# Patient Record
Sex: MALE | URBAN - METROPOLITAN AREA
[De-identification: names, ages, dates, MRNs, and addresses within clinical notes are randomized per-mention and may not be internally consistent; named-entity substitution may affect disease eponyms.]

---

## 2023-11-28 ENCOUNTER — OFFICE VISIT (OUTPATIENT)
Dept: URBAN - METROPOLITAN AREA CLINIC 63 | Facility: CLINIC | Age: 83
End: 2023-11-28
Payer: MEDICARE

## 2023-11-28 VITALS
DIASTOLIC BLOOD PRESSURE: 70 MMHG | BODY MASS INDEX: 24.34 KG/M2 | WEIGHT: 170 LBS | TEMPERATURE: 96.8 F | SYSTOLIC BLOOD PRESSURE: 130 MMHG | HEART RATE: 81 BPM | HEIGHT: 70 IN

## 2023-11-28 DIAGNOSIS — Z87.19 HISTORY OF BARRETT'S ESOPHAGUS: ICD-10-CM

## 2023-11-28 DIAGNOSIS — R19.8 ALTERNATING CONSTIPATION AND DIARRHEA: ICD-10-CM

## 2023-11-28 PROBLEM — 14760008: Status: ACTIVE | Noted: 2023-11-28

## 2023-11-28 PROCEDURE — 99204 OFFICE O/P NEW MOD 45 MIN: CPT

## 2023-11-28 RX ORDER — ASCORBIC ACID 250 MG
1 TABLET TABLET,CHEWABLE ORAL ONCE A DAY
Status: ACTIVE | COMMUNITY

## 2023-11-28 RX ORDER — MULTIVITAMIN
1 TABLET TABLET ORAL ONCE A DAY
Status: ACTIVE | COMMUNITY

## 2023-11-28 RX ORDER — ISOSORBIDE MONONITRATE 120 MG/1
1 TABLET IN THE MORNING TABLET, EXTENDED RELEASE ORAL ONCE A DAY
Status: ACTIVE | COMMUNITY

## 2023-11-28 RX ORDER — ALUMINUM ZIRCONIUM TRICHLOROHYDREX GLY 0.19 G/G
1 TABLET 30 MINUTES BEFORE MORNING MEAL STICK TOPICAL ONCE A DAY
Status: ACTIVE | COMMUNITY

## 2023-11-28 RX ORDER — CILOSTAZOL 100 MG/1
1 TABLET 30 MINUTES BEFORE OR 2 HOURS AFTER BREAKFAST AND DINNER TABLET ORAL TWICE A DAY
Status: ACTIVE | COMMUNITY

## 2023-11-28 RX ORDER — VALSARTAN 320 MG/1
1 TABLET TABLET, FILM COATED ORAL ONCE A DAY
Status: ACTIVE | COMMUNITY

## 2023-11-28 RX ORDER — ASCORBIC ACID 1000 MG
1 CAPSULE TABLET ORAL ONCE A DAY
Status: ACTIVE | COMMUNITY

## 2023-11-28 RX ORDER — ROSUVASTATIN CALCIUM 20 MG/1
1 TABLET TABLET, COATED ORAL ONCE A DAY
Status: ACTIVE | COMMUNITY

## 2023-11-28 RX ORDER — BENZONATATE 100 MG/1
1 CAPSULE AS NEEDED CAPSULE ORAL THREE TIMES A DAY
Status: ACTIVE | COMMUNITY

## 2023-11-28 RX ORDER — METOPROLOL SUCCINATE 25 MG/1
1 TABLET TABLET, FILM COATED, EXTENDED RELEASE ORAL ONCE A DAY
Status: ACTIVE | COMMUNITY

## 2023-11-28 RX ORDER — PRASUGREL 10 MG/1
AS DIRECTED TABLET, FILM COATED ORAL
Status: ACTIVE | COMMUNITY

## 2023-11-28 NOTE — HPI-TODAY'S VISIT:
Aiden is an 83-year-old male presenting to the office today for evaluation of alternating constipation/diarrhea. This has been an ongoing problem for about 3 years. He states that he will have 4-5 bowel movements per week. Normally he has a [place] stool 3 but when he has diarrhea it will be a [place] 6 or 7. He has not noticed anything that makes symptoms better or worse. He does eat dairy. His last colonoscopy was about 15 years ago. However, it he has been doing Cologuard. He did have a negative Cologuard 3 years ago per patient. He also had a negative fit test on 11/2/2023. He does admit to 10 pound weight loss this year, however, he has been trying to lose weight. He also has a history of Aguila's esophagus per patient. His last EGD was 20 years ago. He takes famotidine in the evening and has not had any reflux/breakthrough symptoms. He has no other complaints, questions, concerns. He denies fever, nausea/vomiting, GERD, hematemesis, anorexia, melena, hematochezia, blood when wiping, change in stool caliber, unintentional weight loss, abdominal pain.

## 2023-11-28 NOTE — HPI-PREVIOUS LABS
Labs dated 11/2/2023; - Negative fecal occult blood immunoassay - White blood cells stool-none seen - CBC; RBC 3.77, hemoglobin 12.2, hematocrit 36.9, MCH 32.4, MCV 97.9, platelets 127, neutrophil percent 68.8, lymph #0.8 . Labs dated 10/25/2023 displaying the following abnormalities; - CMP; GFR 64.56

## 2023-12-01 ENCOUNTER — WEB ENCOUNTER (OUTPATIENT)
Dept: URBAN - METROPOLITAN AREA CLINIC 63 | Facility: CLINIC | Age: 83
End: 2023-12-01

## 2024-03-01 ENCOUNTER — OV EP (OUTPATIENT)
Dept: URBAN - METROPOLITAN AREA CLINIC 60 | Facility: CLINIC | Age: 84
End: 2024-03-01
Payer: MEDICARE

## 2024-03-01 VITALS
WEIGHT: 168 LBS | TEMPERATURE: 98.7 F | DIASTOLIC BLOOD PRESSURE: 66 MMHG | RESPIRATION RATE: 12 BRPM | SYSTOLIC BLOOD PRESSURE: 114 MMHG | HEART RATE: 68 BPM | BODY MASS INDEX: 24.05 KG/M2 | HEIGHT: 70 IN | OXYGEN SATURATION: 98 %

## 2024-03-01 DIAGNOSIS — Z86.010 PERSONAL HISTORY OF COLONIC POLYPS: ICD-10-CM

## 2024-03-01 DIAGNOSIS — R19.8 ALTERNATING CONSTIPATION AND DIARRHEA: ICD-10-CM

## 2024-03-01 DIAGNOSIS — Z87.19 HISTORY OF BARRETT'S ESOPHAGUS: ICD-10-CM

## 2024-03-01 PROBLEM — 428283002: Status: ACTIVE | Noted: 2024-03-01

## 2024-03-01 PROCEDURE — 99214 OFFICE O/P EST MOD 30 MIN: CPT | Performed by: PHYSICIAN ASSISTANT

## 2024-03-01 RX ORDER — VALSARTAN 320 MG/1
1 TABLET TABLET, FILM COATED ORAL ONCE A DAY
Status: ACTIVE | COMMUNITY

## 2024-03-01 RX ORDER — METOPROLOL SUCCINATE 25 MG/1
1 TABLET TABLET, FILM COATED, EXTENDED RELEASE ORAL ONCE A DAY
Status: ACTIVE | COMMUNITY

## 2024-03-01 RX ORDER — MULTIVITAMIN
1 TABLET TABLET ORAL ONCE A DAY
Status: ACTIVE | COMMUNITY

## 2024-03-01 RX ORDER — ALUMINUM ZIRCONIUM TRICHLOROHYDREX GLY 0.19 G/G
1 TABLET 30 MINUTES BEFORE MORNING MEAL STICK TOPICAL ONCE A DAY
Status: ACTIVE | COMMUNITY

## 2024-03-01 RX ORDER — ISOSORBIDE MONONITRATE 120 MG/1
1 TABLET IN THE MORNING TABLET, EXTENDED RELEASE ORAL ONCE A DAY
Status: ACTIVE | COMMUNITY

## 2024-03-01 RX ORDER — BENZONATATE 100 MG/1
1 CAPSULE AS NEEDED CAPSULE ORAL THREE TIMES A DAY
Status: ACTIVE | COMMUNITY

## 2024-03-01 RX ORDER — ASCORBIC ACID 1000 MG
1 CAPSULE TABLET ORAL ONCE A DAY
Status: ACTIVE | COMMUNITY

## 2024-03-01 RX ORDER — PRASUGREL 10 MG/1
AS DIRECTED TABLET, FILM COATED ORAL
Status: ACTIVE | COMMUNITY

## 2024-03-01 RX ORDER — CHOLESTYRAMINE 4 G/9G
1 PACKET MIXED WITH WATER OR NON-CARBONATED DRINK POWDER, FOR SUSPENSION ORAL ONCE A DAY
Qty: 30 | Refills: 2 | OUTPATIENT
Start: 2024-03-01

## 2024-03-01 RX ORDER — CILOSTAZOL 100 MG/1
1 TABLET 30 MINUTES BEFORE OR 2 HOURS AFTER BREAKFAST AND DINNER TABLET ORAL TWICE A DAY
Status: ACTIVE | COMMUNITY

## 2024-03-01 RX ORDER — ROSUVASTATIN CALCIUM 20 MG/1
1 TABLET TABLET, COATED ORAL ONCE A DAY
Status: ACTIVE | COMMUNITY

## 2024-03-01 RX ORDER — ASCORBIC ACID 250 MG
1 TABLET TABLET,CHEWABLE ORAL ONCE A DAY
Status: ACTIVE | COMMUNITY

## 2024-03-01 NOTE — HPI-TODAY'S VISIT:
84-year-old male with CAD on effient, PAD on pletal, hyperlipidemia, hypertension, arthritis, colon polyps, GERD, Aguila's esophagus presents for follow-up.  He is a patient of Dr. Connolly.  He presented to the office in November 2023 to schedule surveillance colonoscopy and also with complaints of alternating constipation with diarrhea which had been going on for about 3 years.  He reported that he would normally have 4-5 bowel movements per week, usually type III on the cc.  He would also have periods of watery diarrhea.  He denied any GERD symptoms on famotidine.  He was advised to eat a high-fiber diet and add fiber therapy daily.  Trial of lactose-free diet was recommended.  Surveillance EGD and colonoscopy were not recommended in view of his advanced age and comorbid conditions.  He follows up in the office today reporting no change in his abnormal bowel habits. He moves his bowels every day. He states he usually starts off with loose stools/diarrhea for a couple of days and then he will take Imodium and his stools will firm up to a type III on BSS. He has anywhere from 1-3 bowel movements per day. He denies any visible blood in the stool. He never feels like he completely evacuates his bowels. He denies any abdominal pain. He states that he is always trying to lose weight and he lost about 10 pounds 6 months ago intentionally. He tried taking psyllium but he read that it was a laxative and it was not working so he stopped using this. He takes a daily probiotic. He tried lactose-free diet for a short time with no improvement. He has had issues with loose stools for about 3 years.  He had a negative fecal occult blood test in November 2023 through his PCP. Stool was also negative for white blood cells at that time.  Last EGD was done about 20 years ago.  Last colonoscopy was done 15 years ago.  He reported having a negative stool Cologuard 3 years ago and a negative fit test in November 2023.

## 2024-03-14 LAB
CAMPYLOBACTER SPP. AG,EIA: (no result)
SALMONELLA AND SHIGELLA, CULTURE: (no result)
SHIGA TOXINS, EIA W/RFL TO E.COLI O157 CULTURE: (no result)

## 2024-03-15 LAB
CALPROTECTIN, FECAL: 82
FECAL FAT, QUALITATIVE: (no result)
PANCREATIC ELASTASE, FECAL: 339

## 2024-04-11 ENCOUNTER — OV EP (OUTPATIENT)
Dept: URBAN - METROPOLITAN AREA CLINIC 60 | Facility: CLINIC | Age: 84
End: 2024-04-11
Payer: MEDICARE

## 2024-04-11 VITALS
WEIGHT: 169 LBS | OXYGEN SATURATION: 98 % | SYSTOLIC BLOOD PRESSURE: 138 MMHG | DIASTOLIC BLOOD PRESSURE: 62 MMHG | BODY MASS INDEX: 24.2 KG/M2 | HEIGHT: 70 IN | HEART RATE: 73 BPM | TEMPERATURE: 98.4 F

## 2024-04-11 DIAGNOSIS — R19.8 ALTERNATING CONSTIPATION AND DIARRHEA: ICD-10-CM

## 2024-04-11 DIAGNOSIS — Z87.19 HISTORY OF BARRETT'S ESOPHAGUS: ICD-10-CM

## 2024-04-11 PROCEDURE — 99213 OFFICE O/P EST LOW 20 MIN: CPT

## 2024-04-11 RX ORDER — ROSUVASTATIN CALCIUM 20 MG/1
1 TABLET TABLET, COATED ORAL ONCE A DAY
Status: ACTIVE | COMMUNITY

## 2024-04-11 RX ORDER — CHOLESTYRAMINE 4 G/9G
1 PACKET MIXED WITH WATER OR NON-CARBONATED DRINK POWDER, FOR SUSPENSION ORAL ONCE A DAY
Qty: 30 | Refills: 2 | COMMUNITY
Start: 2024-03-01

## 2024-04-11 RX ORDER — ALUMINUM ZIRCONIUM TRICHLOROHYDREX GLY 0.19 G/G
1 TABLET 30 MINUTES BEFORE MORNING MEAL STICK TOPICAL ONCE A DAY
Status: ON HOLD | COMMUNITY

## 2024-04-11 RX ORDER — ISOSORBIDE MONONITRATE 120 MG/1
1 TABLET IN THE MORNING TABLET, EXTENDED RELEASE ORAL ONCE A DAY
Status: ACTIVE | COMMUNITY

## 2024-04-11 RX ORDER — VALSARTAN 320 MG/1
1 TABLET TABLET, FILM COATED ORAL ONCE A DAY
Status: ACTIVE | COMMUNITY

## 2024-04-11 RX ORDER — MULTIVITAMIN
1 TABLET TABLET ORAL ONCE A DAY
Status: ON HOLD | COMMUNITY

## 2024-04-11 RX ORDER — METOPROLOL SUCCINATE 25 MG/1
1 TABLET TABLET, FILM COATED, EXTENDED RELEASE ORAL ONCE A DAY
Status: ACTIVE | COMMUNITY

## 2024-04-11 RX ORDER — ASCORBIC ACID 250 MG
1 TABLET TABLET,CHEWABLE ORAL ONCE A DAY
Status: ON HOLD | COMMUNITY

## 2024-04-11 RX ORDER — PRASUGREL 10 MG/1
AS DIRECTED TABLET, FILM COATED ORAL
Status: ACTIVE | COMMUNITY

## 2024-04-11 RX ORDER — ASCORBIC ACID 1000 MG
1 CAPSULE TABLET ORAL ONCE A DAY
Status: ON HOLD | COMMUNITY

## 2024-04-11 RX ORDER — CILOSTAZOL 100 MG/1
1 TABLET 30 MINUTES BEFORE OR 2 HOURS AFTER BREAKFAST AND DINNER TABLET ORAL TWICE A DAY
Status: ACTIVE | COMMUNITY

## 2024-04-11 RX ORDER — BENZONATATE 100 MG/1
1 CAPSULE AS NEEDED CAPSULE ORAL THREE TIMES A DAY
Status: ACTIVE | COMMUNITY

## 2024-04-11 NOTE — HPI-TODAY'S VISIT:
Gilbert is an 84-year-old male presenting to the office today for follow-up on alternating constipation and diarrhea. At his last visit he was advised to start a low FODMAP diet and was started on cholestyramine. He states that symptoms have been much better since his last visit. Normally having 1-2 bowel movements per day and doing well overall. He has had diarrhea twice since his last visit including diarrhea earlier today. Otherwise, he has no GI complaints, questions, concerns at this time. He denies melena, hematochezia, bright red blood per rectum, nausea/vomiting, hematemesis, dysphagia, reflux, abdominal pain, change in stool caliber, unintentional weight loss/weight gain. . 3/15/2024 labs; - Fecal calprotectin, pancreatic elastase, fecal fat, stool culture within normal limits . Labs dated 11/2/2023; - Negative fecal occult blood immunoassay - White blood cells stool-none seen - CBC; RBC 3.77, hemoglobin 12.2, hematocrit 36.9, MCH 32.4, MCV 97.9, platelets 127, neutrophil percent 68.8, lymph #0.8 . Labs dated 10/25/2023 displaying the following abnormalities; - CMP; GFR 64.56

## 2024-06-18 ENCOUNTER — DASHBOARD ENCOUNTERS (OUTPATIENT)
Age: 84
End: 2024-06-18

## 2024-06-18 ENCOUNTER — OFFICE VISIT (OUTPATIENT)
Dept: URBAN - METROPOLITAN AREA CLINIC 60 | Facility: CLINIC | Age: 84
End: 2024-06-18
Payer: MEDICARE

## 2024-06-18 VITALS
TEMPERATURE: 97.1 F | HEIGHT: 70 IN | BODY MASS INDEX: 24.05 KG/M2 | DIASTOLIC BLOOD PRESSURE: 64 MMHG | SYSTOLIC BLOOD PRESSURE: 130 MMHG | WEIGHT: 168 LBS

## 2024-06-18 DIAGNOSIS — R19.4 CHANGE IN BOWEL HABITS: ICD-10-CM

## 2024-06-18 DIAGNOSIS — Z12.11 COLON CANCER SCREENING: ICD-10-CM

## 2024-06-18 DIAGNOSIS — K22.70 BARRETT'S ESOPHAGUS WITHOUT DYSPLASIA: ICD-10-CM

## 2024-06-18 PROBLEM — 302914006: Status: ACTIVE | Noted: 2024-06-18

## 2024-06-18 PROCEDURE — 99214 OFFICE O/P EST MOD 30 MIN: CPT

## 2024-06-18 RX ORDER — ROSUVASTATIN CALCIUM 20 MG/1
1 TABLET TABLET, COATED ORAL ONCE A DAY
Status: ACTIVE | COMMUNITY

## 2024-06-18 RX ORDER — ASCORBIC ACID 1000 MG
1 CAPSULE TABLET ORAL ONCE A DAY
Status: ON HOLD | COMMUNITY

## 2024-06-18 RX ORDER — ISOSORBIDE MONONITRATE 120 MG/1
1 TABLET IN THE MORNING TABLET, EXTENDED RELEASE ORAL ONCE A DAY
Status: ACTIVE | COMMUNITY

## 2024-06-18 RX ORDER — CILOSTAZOL 100 MG/1
1 TABLET 30 MINUTES BEFORE OR 2 HOURS AFTER BREAKFAST AND DINNER TABLET ORAL TWICE A DAY
Status: ACTIVE | COMMUNITY

## 2024-06-18 RX ORDER — BENZONATATE 100 MG/1
1 CAPSULE AS NEEDED CAPSULE ORAL THREE TIMES A DAY
Status: ACTIVE | COMMUNITY

## 2024-06-18 RX ORDER — ASCORBIC ACID 250 MG
1 TABLET TABLET,CHEWABLE ORAL ONCE A DAY
Status: ON HOLD | COMMUNITY

## 2024-06-18 RX ORDER — MULTIVITAMIN
1 TABLET TABLET ORAL ONCE A DAY
Status: ON HOLD | COMMUNITY

## 2024-06-18 RX ORDER — ONDANSETRON HYDROCHLORIDE 4 MG/1
1 TABLET TABLET, FILM COATED ORAL
Qty: 2 | Refills: 0 | OUTPATIENT
Start: 2024-06-18

## 2024-06-18 RX ORDER — PRASUGREL 10 MG/1
AS DIRECTED TABLET, FILM COATED ORAL
Status: ACTIVE | COMMUNITY

## 2024-06-18 RX ORDER — VALSARTAN 320 MG/1
1 TABLET TABLET, FILM COATED ORAL ONCE A DAY
Status: ACTIVE | COMMUNITY

## 2024-06-18 RX ORDER — ALUMINUM ZIRCONIUM TRICHLOROHYDREX GLY 0.19 G/G
1 TABLET 30 MINUTES BEFORE MORNING MEAL STICK TOPICAL ONCE A DAY
Status: ON HOLD | COMMUNITY

## 2024-06-18 RX ORDER — CHOLESTYRAMINE 4 G/9G
1 PACKET MIXED WITH WATER OR NON-CARBONATED DRINK POWDER, FOR SUSPENSION ORAL ONCE A DAY
Qty: 30 | Refills: 2 | COMMUNITY
Start: 2024-03-01

## 2024-06-18 RX ORDER — METOPROLOL SUCCINATE 25 MG/1
1 TABLET TABLET, FILM COATED, EXTENDED RELEASE ORAL ONCE A DAY
Status: ACTIVE | COMMUNITY

## 2024-06-18 NOTE — HPI-ZZZTODAY'S VISIT
Gilbert is a very pleasant 84-year-old male who presents for 2-month follow-up.  He is a patient of Dr. Connolly.  Last seen by Omar Swift PA-C on 4/11/2024.  Past medical history significant for hypertension, GERD, arthritis, colon polyps, CAD,  HLD, Aguila's esophagus.  Past surgical history significant for cardiac catheterization, back surgery, tonsillectomy.  Last colonoscopy November 2010.  Family history noncontributory. Gilbert has been evaluated for alternating constipation and diarrhea.  He was advised previously to start a low FODMAP diet and cholestyramine.  This is improved symptoms drastically.  It was discussed with patient the trial of Xifaxan could be considered if repeat incidence.  Patient has history of Aguila's esophagus but due to age and comorbidity was decided that we would not proceed with EGD  Gilbert presents for follow up. He has continued to have fluctuating bowel habits. This is interfering with dialy activity. He has tried cholestyramine with limited efficacy. FODMAP seems to help. He still feels the he does not know what foods cause his flutuating bowels. He denies dysphagia, dyspepsia, pyrosis, abdominal pain, melena, or hematochezia.

## 2024-06-20 ENCOUNTER — WEB ENCOUNTER (OUTPATIENT)
Dept: URBAN - METROPOLITAN AREA CLINIC 60 | Facility: CLINIC | Age: 84
End: 2024-06-20

## 2024-06-20 RX ORDER — ONDANSETRON HYDROCHLORIDE 4 MG/1
1 TABLET TABLET, FILM COATED ORAL
Qty: 2 | Refills: 0
Start: 2024-06-18

## 2024-06-25 ENCOUNTER — LAB OUTSIDE AN ENCOUNTER (OUTPATIENT)
Dept: URBAN - METROPOLITAN AREA CLINIC 60 | Facility: CLINIC | Age: 84
End: 2024-06-25

## 2024-07-08 ENCOUNTER — WEB ENCOUNTER (OUTPATIENT)
Dept: URBAN - METROPOLITAN AREA CLINIC 60 | Facility: CLINIC | Age: 84
End: 2024-07-08

## 2024-07-16 ENCOUNTER — TELEPHONE ENCOUNTER (OUTPATIENT)
Dept: URBAN - METROPOLITAN AREA CLINIC 60 | Facility: CLINIC | Age: 84
End: 2024-07-16

## 2024-07-24 ENCOUNTER — TELEPHONE ENCOUNTER (OUTPATIENT)
Dept: URBAN - METROPOLITAN AREA CLINIC 63 | Facility: CLINIC | Age: 84
End: 2024-07-24

## 2024-08-05 ENCOUNTER — CLAIMS CREATED FROM THE CLAIM WINDOW (OUTPATIENT)
Dept: URBAN - METROPOLITAN AREA SURGERY CENTER 4 | Facility: SURGERY CENTER | Age: 84
End: 2024-08-05
Payer: MEDICARE

## 2024-08-05 ENCOUNTER — CLAIMS CREATED FROM THE CLAIM WINDOW (OUTPATIENT)
Dept: URBAN - METROPOLITAN AREA CLINIC 4 | Facility: CLINIC | Age: 84
End: 2024-08-05
Payer: MEDICARE

## 2024-08-05 DIAGNOSIS — K44.9 DIAPHRAGMATIC HERNIA WITHOUT OBSTRUCTION OR GANGRENE: ICD-10-CM

## 2024-08-05 DIAGNOSIS — K57.30 DIVERTICULOSIS OF LARGE INTESTINE WITHOUT PERFORATION OR ABSCESS WITHOUT BLEEDING: ICD-10-CM

## 2024-08-05 DIAGNOSIS — K22.719 BARRETT'S ESOPHAGUS WITH DYSPLASIA, UNSPECIFIED: ICD-10-CM

## 2024-08-05 DIAGNOSIS — D12.2 ADENOMATOUS POLYP OF ASCENDING COLON: ICD-10-CM

## 2024-08-05 DIAGNOSIS — K29.70 GASTRITIS: ICD-10-CM

## 2024-08-05 DIAGNOSIS — K64.0 FIRST DEGREE HEMORRHOIDS: ICD-10-CM

## 2024-08-05 DIAGNOSIS — K63.5 POLYP OF ASCENDING COLON, UNSPECIFIED TYPE: ICD-10-CM

## 2024-08-05 DIAGNOSIS — Z12.11 ENCOUNTER FOR SCREENING FOR MALIGNANT NEOPLASM OF COLON: ICD-10-CM

## 2024-08-05 DIAGNOSIS — Z12.11 COLON CANCER SCREENING: ICD-10-CM

## 2024-08-05 DIAGNOSIS — K29.60 OTHER GASTRITIS WITHOUT BLEEDING: ICD-10-CM

## 2024-08-05 DIAGNOSIS — D12.3 BENIGN NEOPLASM OF TRANSVERSE COLON: ICD-10-CM

## 2024-08-05 DIAGNOSIS — D12.3 BENIGN NEOPLASM OF HEPATIC FLEXURE OF COLON: ICD-10-CM

## 2024-08-05 DIAGNOSIS — K29.70 GASTRITIS, UNSPECIFIED, WITHOUT BLEEDING: ICD-10-CM

## 2024-08-05 DIAGNOSIS — K44.9 HIATAL HERNIA: ICD-10-CM

## 2024-08-05 DIAGNOSIS — K22.89 OTHER SPECIFIED DISEASE OF ESOPHAGUS: ICD-10-CM

## 2024-08-05 DIAGNOSIS — D12.2 BENIGN NEOPLASM OF ASCENDING COLON: ICD-10-CM

## 2024-08-05 PROCEDURE — 88342 IMHCHEM/IMCYTCHM 1ST ANTB: CPT | Performed by: PATHOLOGY

## 2024-08-05 PROCEDURE — 88305 TISSUE EXAM BY PATHOLOGIST: CPT | Performed by: PATHOLOGY

## 2024-08-05 PROCEDURE — 45385 COLONOSCOPY W/LESION REMOVAL: CPT | Performed by: CLINIC/CENTER

## 2024-08-05 PROCEDURE — 43239 EGD BIOPSY SINGLE/MULTIPLE: CPT | Performed by: INTERNAL MEDICINE

## 2024-08-05 PROCEDURE — 43239 EGD BIOPSY SINGLE/MULTIPLE: CPT | Performed by: CLINIC/CENTER

## 2024-08-05 PROCEDURE — 00813 ANES UPR LWR GI NDSC PX: CPT | Performed by: NURSE ANESTHETIST, CERTIFIED REGISTERED

## 2024-08-05 PROCEDURE — 45385 COLONOSCOPY W/LESION REMOVAL: CPT | Performed by: INTERNAL MEDICINE

## 2024-08-05 RX ORDER — ALUMINUM ZIRCONIUM TRICHLOROHYDREX GLY 0.19 G/G
1 TABLET 30 MINUTES BEFORE MORNING MEAL STICK TOPICAL ONCE A DAY
Status: ON HOLD | COMMUNITY

## 2024-08-05 RX ORDER — ISOSORBIDE MONONITRATE 120 MG/1
1 TABLET IN THE MORNING TABLET, EXTENDED RELEASE ORAL ONCE A DAY
Status: ACTIVE | COMMUNITY

## 2024-08-05 RX ORDER — ASCORBIC ACID 1000 MG
1 CAPSULE TABLET ORAL ONCE A DAY
Status: ON HOLD | COMMUNITY

## 2024-08-05 RX ORDER — ROSUVASTATIN CALCIUM 20 MG/1
1 TABLET TABLET, COATED ORAL ONCE A DAY
Status: ACTIVE | COMMUNITY

## 2024-08-05 RX ORDER — ASCORBIC ACID 250 MG
1 TABLET TABLET,CHEWABLE ORAL ONCE A DAY
Status: ON HOLD | COMMUNITY

## 2024-08-05 RX ORDER — CHOLESTYRAMINE 4 G/9G
1 PACKET MIXED WITH WATER OR NON-CARBONATED DRINK POWDER, FOR SUSPENSION ORAL ONCE A DAY
Qty: 30 | Refills: 2 | COMMUNITY
Start: 2024-03-01

## 2024-08-05 RX ORDER — CILOSTAZOL 100 MG/1
1 TABLET 30 MINUTES BEFORE OR 2 HOURS AFTER BREAKFAST AND DINNER TABLET ORAL TWICE A DAY
Status: ACTIVE | COMMUNITY

## 2024-08-05 RX ORDER — PRASUGREL 10 MG/1
AS DIRECTED TABLET, FILM COATED ORAL
Status: ACTIVE | COMMUNITY

## 2024-08-05 RX ORDER — MULTIVITAMIN
1 TABLET TABLET ORAL ONCE A DAY
Status: ON HOLD | COMMUNITY

## 2024-08-05 RX ORDER — VALSARTAN 320 MG/1
1 TABLET TABLET, FILM COATED ORAL ONCE A DAY
Status: ACTIVE | COMMUNITY

## 2024-08-05 RX ORDER — METOPROLOL SUCCINATE 25 MG/1
1 TABLET TABLET, FILM COATED, EXTENDED RELEASE ORAL ONCE A DAY
Status: ACTIVE | COMMUNITY

## 2024-08-05 RX ORDER — ONDANSETRON HYDROCHLORIDE 4 MG/1
1 TABLET TABLET, FILM COATED ORAL
Qty: 2 | Refills: 0 | Status: ACTIVE | COMMUNITY
Start: 2024-06-18

## 2024-08-05 RX ORDER — BENZONATATE 100 MG/1
1 CAPSULE AS NEEDED CAPSULE ORAL THREE TIMES A DAY
Status: ACTIVE | COMMUNITY

## 2024-09-05 ENCOUNTER — OFFICE VISIT (OUTPATIENT)
Dept: URBAN - METROPOLITAN AREA CLINIC 63 | Facility: CLINIC | Age: 84
End: 2024-09-05
Payer: MEDICARE

## 2024-09-05 VITALS
SYSTOLIC BLOOD PRESSURE: 120 MMHG | DIASTOLIC BLOOD PRESSURE: 62 MMHG | HEART RATE: 69 BPM | TEMPERATURE: 97.4 F | HEIGHT: 70 IN | WEIGHT: 169.2 LBS | OXYGEN SATURATION: 94 % | BODY MASS INDEX: 24.22 KG/M2

## 2024-09-05 DIAGNOSIS — R19.4 CHANGE IN BOWEL HABITS: ICD-10-CM

## 2024-09-05 DIAGNOSIS — K22.70 BARRETT'S ESOPHAGUS WITHOUT DYSPLASIA: ICD-10-CM

## 2024-09-05 DIAGNOSIS — K31.A0 GASTRIC INTESTINAL METAPLASIA: ICD-10-CM

## 2024-09-05 DIAGNOSIS — Z12.11 COLON CANCER SCREENING: ICD-10-CM

## 2024-09-05 PROCEDURE — 99214 OFFICE O/P EST MOD 30 MIN: CPT

## 2024-09-05 RX ORDER — METOPROLOL SUCCINATE 25 MG/1
1 TABLET TABLET, FILM COATED, EXTENDED RELEASE ORAL ONCE A DAY
Status: ACTIVE | COMMUNITY

## 2024-09-05 RX ORDER — CILOSTAZOL 100 MG/1
1 TABLET 30 MINUTES BEFORE OR 2 HOURS AFTER BREAKFAST AND DINNER TABLET ORAL TWICE A DAY
Status: ACTIVE | COMMUNITY

## 2024-09-05 RX ORDER — BENZONATATE 100 MG/1
1 CAPSULE AS NEEDED CAPSULE ORAL THREE TIMES A DAY
Status: ACTIVE | COMMUNITY

## 2024-09-05 RX ORDER — ISOSORBIDE MONONITRATE 120 MG/1
1 TABLET IN THE MORNING TABLET, EXTENDED RELEASE ORAL ONCE A DAY
Status: ACTIVE | COMMUNITY

## 2024-09-05 RX ORDER — ROSUVASTATIN CALCIUM 20 MG/1
1 TABLET TABLET, COATED ORAL ONCE A DAY
Status: ACTIVE | COMMUNITY

## 2024-09-05 RX ORDER — VALSARTAN 320 MG/1
1 TABLET TABLET, FILM COATED ORAL ONCE A DAY
Status: ACTIVE | COMMUNITY

## 2024-09-05 RX ORDER — PRASUGREL 10 MG/1
AS DIRECTED TABLET, FILM COATED ORAL
Status: ACTIVE | COMMUNITY

## 2024-09-05 NOTE — HPI-PREVIOUS PROCEDURES
8/5/2024 endoscopy consistent with normal esophagus biopsy consistent with focal intestinal metaplasia with background of reflux esophagitis negative for infectious organism, EOE, dysplasia, malignancy 2 cm hiatal hernia Acute erosive gastritis biopsy consistent with reactive gastropathy and focal intestinal metaplasia negative for H. pylori dysplasia or malignancy Normal duodenum Z-line irregular 8/5/2024 colonoscopy consistent with nonbleeding internal hemorrhoids 15 mm polyp in ascending colon clip placed consistent with tubulovillous adenoma Mild diverticulosis 10 mm polyp in ascending colon consistent with tubulovillous adenoma 10 mm polyp in hepatic flexure consistent with tubular adenoma

## 2024-09-05 NOTE — HPI-ZZZTODAY'S VISIT
Patient is a very pleasant 84-year-old male who presents for follow-up.  He is a patient of Dr. Connolly.  I last saw him in office on 6/18/2024.  Past medical history significant for hypertension, GERD, arthritis, colon polyps, CAD, HLD, Aguila's esophagus.  Past surgical history significant for cardiac catheterization, back surgery, tonsillectomy.  Last colonoscopy and endoscopy 8/5/2024.  Family history noncontributory. Patient has been followed for alternating constipation and diarrhea.  He was previously started on a low FODMAP diet and cholestyramine.  His symptoms improved drastically.  It was discussed that Xifaxan may be tried if repeat incidence. For continued change in bowel habits a colonoscopy was recommended.  Because he was already proceeding with a colonoscopy and he opted to also do his Aguila's esophagus surveillance.  Patient presents for colonoscopy and endoscopy follow-up.  He tolerated procedures well with no difficulty.  He does  Continue to have fluctuating bowel movements.  He will have regular bowel movements for about 2 days and subsequently developed diarrhea.  He will then take an Imodium that will lead to constipation and the cycle will continue.  He tried cholestyramine for his diarrhea instead of Imodium and this did not seem to help all the time but occasionally it did.  He continues to take Prilosec with good efficacy for his acid reflux.  He has tried to come off of it in the past and once unable to.  He denies dysphagia, dyspepsia, pyrosis, abdominal pain, change in bowel habits, unintentional weight loss, melena, hematochezia.

## 2024-09-09 ENCOUNTER — WEB ENCOUNTER (OUTPATIENT)
Dept: URBAN - METROPOLITAN AREA CLINIC 63 | Facility: CLINIC | Age: 84
End: 2024-09-09

## 2024-09-11 ENCOUNTER — WEB ENCOUNTER (OUTPATIENT)
Dept: URBAN - METROPOLITAN AREA CLINIC 63 | Facility: CLINIC | Age: 84
End: 2024-09-11

## 2024-11-06 ENCOUNTER — OFFICE VISIT (OUTPATIENT)
Dept: URBAN - METROPOLITAN AREA CLINIC 57 | Facility: CLINIC | Age: 84
End: 2024-11-06

## 2024-11-06 ENCOUNTER — OFFICE VISIT (OUTPATIENT)
Dept: URBAN - METROPOLITAN AREA CLINIC 60 | Facility: CLINIC | Age: 84
End: 2024-11-06
Payer: MEDICARE

## 2024-11-06 VITALS
TEMPERATURE: 98.6 F | OXYGEN SATURATION: 98 % | HEIGHT: 70 IN | RESPIRATION RATE: 12 BRPM | SYSTOLIC BLOOD PRESSURE: 136 MMHG | HEART RATE: 67 BPM | BODY MASS INDEX: 23.59 KG/M2 | DIASTOLIC BLOOD PRESSURE: 64 MMHG | WEIGHT: 164.8 LBS

## 2024-11-06 DIAGNOSIS — K58.0 IRRITABLE BOWEL SYNDROME WITH DIARRHEA: ICD-10-CM

## 2024-11-06 PROBLEM — 197125005: Status: ACTIVE | Noted: 2024-11-06

## 2024-11-06 PROCEDURE — 99214 OFFICE O/P EST MOD 30 MIN: CPT

## 2024-11-06 RX ORDER — PRASUGREL 10 MG/1
AS DIRECTED TABLET, FILM COATED ORAL
Status: ACTIVE | COMMUNITY

## 2024-11-06 RX ORDER — VALSARTAN 320 MG/1
1 TABLET TABLET, FILM COATED ORAL ONCE A DAY
Status: ACTIVE | COMMUNITY

## 2024-11-06 RX ORDER — METOPROLOL SUCCINATE 25 MG/1
1 TABLET TABLET, FILM COATED, EXTENDED RELEASE ORAL ONCE A DAY
Status: ACTIVE | COMMUNITY

## 2024-11-06 RX ORDER — CILOSTAZOL 100 MG/1
1 TABLET 30 MINUTES BEFORE OR 2 HOURS AFTER BREAKFAST AND DINNER TABLET ORAL TWICE A DAY
Status: ACTIVE | COMMUNITY

## 2024-11-06 RX ORDER — ROSUVASTATIN CALCIUM 20 MG/1
1 TABLET TABLET, COATED ORAL ONCE A DAY
Status: ACTIVE | COMMUNITY

## 2024-11-06 RX ORDER — ISOSORBIDE MONONITRATE 120 MG/1
1 TABLET IN THE MORNING TABLET, EXTENDED RELEASE ORAL ONCE A DAY
Status: ACTIVE | COMMUNITY

## 2024-11-06 NOTE — HPI-TODAY'S VISIT:
Patient is an 84-year-old male with past medical history of chronic diarrhea who presents today for follow-up.  At today's visit patient states that his diarrhea symptoms are unchanged.  He did try Xifaxan for a few days but unfortunately it made his diarrhea worse and also give him dizziness so he discontinued it.  He does take Imodium as needed which works well to control his symptoms.  At 1 point he also tried cholestyramine however it did not make a difference in his diarrhea symptoms.  He continues to deny hematochezia and melena along with abdominal pain. . Colonoscopy 8/5/2024 - Nonbleeding internal hemorrhoids - One 15 mm polyp in ascending colon, removed with hot snare, clip was placed ([employer]) - Mild diverticulosis in the entire examined colon, no evidence of diverticular bleeding - One10 mm polyp in the ascending colon, moved with cold snare - One10 mm polyp at the hepatic flexure, removed with cold snare - Pathology: Ascending colon polyp-tubulovillous adenoma; hepatic flexure polyp-tubular adenoma - Repeat colonoscopy not recommended due to current age . EGD 8/5/2024 - Normal esophagus, biopsied - 2 cm hiatal hernia - Acute erosive gastritis, biopsied - Normal duodenal bulb and second portion of duodenum - Z-line irregular - Pathology: Stomach antrum/body biopsy-chemical/reactive gastropathy associated with focal intestinal metaplasia, negative for H. pylori, dysplasia or malignancy; GE junction biopsy-squamocolumnar mucosa with focal intestinal metaplasia arising in a background of reflux type esophagitis consistent with Aguila's esophagus, negative for infectious organisms or EOE, negative for dysplasia or malignancy . Stool studies 3/7/2024 - Fecal calprotectin 82 - Pancreatic elastase within normal limits - Fecal fat normal - Stool culture negative

## 2025-05-07 ENCOUNTER — OFFICE VISIT (OUTPATIENT)
Dept: URBAN - METROPOLITAN AREA CLINIC 60 | Facility: CLINIC | Age: 85
End: 2025-05-07
Payer: MEDICARE

## 2025-05-07 DIAGNOSIS — K58.0 IRRITABLE BOWEL SYNDROME WITH DIARRHEA: ICD-10-CM

## 2025-05-07 PROCEDURE — 99214 OFFICE O/P EST MOD 30 MIN: CPT

## 2025-05-07 RX ORDER — ELUXADOLINE 100 MG/1
1 TABLET WITH FOOD TABLET, FILM COATED ORAL TWICE A DAY
Qty: 60 TABLET | Refills: 2 | OUTPATIENT
Start: 2025-05-07 | End: 2025-08-05

## 2025-05-07 RX ORDER — BENZONATATE 100 MG/1
1 CAPSULE AS NEEDED CAPSULE ORAL THREE TIMES A DAY
Status: ACTIVE | COMMUNITY

## 2025-05-07 RX ORDER — METOPROLOL SUCCINATE 25 MG/1
1 TABLET TABLET, FILM COATED, EXTENDED RELEASE ORAL ONCE A DAY
Status: ACTIVE | COMMUNITY

## 2025-05-07 RX ORDER — VALSARTAN 320 MG/1
1 TABLET TABLET, FILM COATED ORAL ONCE A DAY
Status: ACTIVE | COMMUNITY

## 2025-05-07 RX ORDER — CILOSTAZOL 100 MG/1
1 TABLET 30 MINUTES BEFORE OR 2 HOURS AFTER BREAKFAST AND DINNER TABLET ORAL TWICE A DAY
Status: ACTIVE | COMMUNITY

## 2025-05-07 RX ORDER — PRASUGREL 10 MG/1
AS DIRECTED TABLET, FILM COATED ORAL
Status: ACTIVE | COMMUNITY

## 2025-05-07 NOTE — HPI-TODAY'S VISIT:
Patient is an 85-year-old male with a past medical history of IBS-D who presents today for follow-up.  At today's visit patient states that his diarrhea symptoms remain unchanged.  He did try Xifaxan however it made the diarrhea worse and gave him dizziness so this was discontinued.  He has also tried cholestyramine and fiber supplementation in the past without much change in symptoms.  Currently he is continuing to take Imodium as needed which works well to control his symptoms however he is interested in trying prescription medication.  Colestipol and Viberzi were discussed and patient is interested in a trial of Viberzi at this time. He continues to deny hematochezia and melena along with abdominal pain. . Colonoscopy 8/5/2024 - Nonbleeding internal hemorrhoids - One 15 mm polyp in ascending colon, removed with hot snare, clip was placed ([employer]) - Mild diverticulosis in the entire examined colon, no evidence of diverticular bleeding - One10 mm polyp in the ascending colon, moved with cold snare - One10 mm polyp at the hepatic flexure, removed with cold snare - Pathology: Ascending colon polyp-tubulovillous adenoma; hepatic flexure polyp-tubular adenoma - Repeat colonoscopy not recommended due to current age . EGD 8/5/2024 - Normal esophagus, biopsied - 2 cm hiatal hernia - Acute erosive gastritis, biopsied - Normal duodenal bulb and second portion of duodenum - Z-line irregular - Pathology: Stomach antrum/body biopsy-chemical/reactive gastropathy associated with focal intestinal metaplasia, negative for H. pylori, dysplasia or malignancy; GE junction biopsy-squamocolumnar mucosa with focal intestinal metaplasia arising in a background of reflux type esophagitis consistent with Aguila's esophagus, negative for infectious organisms or EOE, negative for dysplasia or malignancy . Stool studies 3/7/2024 - Fecal calprotectin 82 - Pancreatic elastase within normal limits - Fecal fat normal - Stool culture negative

## 2025-05-08 ENCOUNTER — TELEPHONE ENCOUNTER (OUTPATIENT)
Dept: URBAN - METROPOLITAN AREA CLINIC 60 | Facility: CLINIC | Age: 85
End: 2025-05-08

## 2025-05-09 ENCOUNTER — TELEPHONE ENCOUNTER (OUTPATIENT)
Dept: URBAN - METROPOLITAN AREA CLINIC 60 | Facility: CLINIC | Age: 85
End: 2025-05-09

## 2025-07-08 ENCOUNTER — OFFICE VISIT (OUTPATIENT)
Dept: URBAN - METROPOLITAN AREA CLINIC 60 | Facility: CLINIC | Age: 85
End: 2025-07-08
Payer: MEDICARE

## 2025-07-08 DIAGNOSIS — K21.9 CHRONIC GERD: ICD-10-CM

## 2025-07-08 DIAGNOSIS — K58.0 IRRITABLE BOWEL SYNDROME WITH DIARRHEA: ICD-10-CM

## 2025-07-08 PROBLEM — 235595009: Status: ACTIVE | Noted: 2025-07-08

## 2025-07-08 PROCEDURE — 99213 OFFICE O/P EST LOW 20 MIN: CPT

## 2025-07-08 RX ORDER — VALSARTAN 320 MG/1
1 TABLET TABLET, FILM COATED ORAL ONCE A DAY
Status: ACTIVE | COMMUNITY

## 2025-07-08 RX ORDER — METOPROLOL SUCCINATE 25 MG/1
1 TABLET TABLET, FILM COATED, EXTENDED RELEASE ORAL ONCE A DAY
Status: ACTIVE | COMMUNITY

## 2025-07-08 RX ORDER — ROSUVASTATIN CALCIUM 20 MG/1
1 TABLET TABLET, COATED ORAL ONCE A DAY
Status: ACTIVE | COMMUNITY

## 2025-07-08 RX ORDER — PRASUGREL 10 MG/1
AS DIRECTED TABLET, FILM COATED ORAL
Status: ACTIVE | COMMUNITY

## 2025-07-08 RX ORDER — ELUXADOLINE 100 MG/1
1 TABLET WITH FOOD TABLET, FILM COATED ORAL TWICE A DAY
Qty: 60 TABLET | Refills: 2 | Status: ACTIVE | COMMUNITY
Start: 2025-05-07 | End: 2025-08-05

## 2025-07-08 RX ORDER — ISOSORBIDE MONONITRATE 120 MG/1
1 TABLET IN THE MORNING TABLET, EXTENDED RELEASE ORAL ONCE A DAY
Status: ACTIVE | COMMUNITY

## 2025-07-08 RX ORDER — BENZONATATE 100 MG/1
1 CAPSULE AS NEEDED CAPSULE ORAL THREE TIMES A DAY
Status: ACTIVE | COMMUNITY

## 2025-07-08 RX ORDER — ESOMEPRAZOLE MAGNESIUM 20 MG/1
1 CAPSULE 1/2 TO 1 HOUR BEFORE MORNING MEAL CAPSULE, DELAYED RELEASE ORAL ONCE A DAY
Status: ACTIVE | COMMUNITY

## 2025-07-08 NOTE — HPI-TODAY'S VISIT:
Patient is an 85-year-old male with a past medical history of IBS-D who presents today for follow-up on diarrhea symptoms.  At today's visit patient states that he did try taking a half a tablet of Viberzi daily however he also started a new acid reducing medication at the same time and did experience chills after 1 week.  Although the Viberzi was working to bulk up his stools, he decided to stop both medications since he was unsure which one was causing his symptoms.  Unfortunately he cannot remember the name of the acid reducing medication he just started.  He has tried Xifaxan, cholestyramine and fiber supplementation in the past without much effectiveness.  He has used Imodium in the past as needed which did work well to control his symptoms.  Discussed with patient trying these new medications independent of 1 another to find out which 1 caused him to have side effects.  Also encouraged patient to inform us of the other new medication he has been prescribed for acid reduction. . Colonoscopy 8/5/2024 - Nonbleeding internal hemorrhoids - One 15 mm polyp in ascending colon, removed with hot snare, clip was placed ([employer]) - Mild diverticulosis in the entire examined colon, no evidence of diverticular bleeding - One10 mm polyp in the ascending colon, moved with cold snare - One10 mm polyp at the hepatic flexure, removed with cold snare - Pathology: Ascending colon polyp-tubulovillous adenoma; hepatic flexure polyp-tubular adenoma - Repeat colonoscopy not recommended due to current age . EGD 8/5/2024 - Normal esophagus, biopsied - 2 cm hiatal hernia - Acute erosive gastritis, biopsied - Normal duodenal bulb and second portion of duodenum - Z-line irregular - Pathology: Stomach antrum/body biopsy-chemical/reactive gastropathy associated with focal intestinal metaplasia, negative for H. pylori, dysplasia or malignancy; GE junction biopsy-squamocolumnar mucosa with focal intestinal metaplasia arising in a background of reflux type esophagitis consistent with Aguila's esophagus, negative for infectious organisms or EOE, negative for dysplasia or malignancy . Stool studies 3/7/2024 - Fecal calprotectin 82 - Pancreatic elastase within normal limits - Fecal fat normal - Stool culture negative